# Patient Record
Sex: FEMALE | Race: WHITE
[De-identification: names, ages, dates, MRNs, and addresses within clinical notes are randomized per-mention and may not be internally consistent; named-entity substitution may affect disease eponyms.]

---

## 2018-03-09 ENCOUNTER — HOSPITAL ENCOUNTER (OUTPATIENT)
Dept: HOSPITAL 95 - LAB EV | Age: 83
End: 2018-03-09
Payer: COMMERCIAL

## 2018-03-09 ENCOUNTER — HOSPITAL ENCOUNTER (EMERGENCY)
Dept: HOSPITAL 95 - ER | Age: 83
Discharge: HOME | End: 2018-03-09
Payer: COMMERCIAL

## 2018-03-09 VITALS — BODY MASS INDEX: 27.8 KG/M2 | HEIGHT: 66 IN | WEIGHT: 173 LBS

## 2018-03-09 DIAGNOSIS — I48.0: Primary | ICD-10-CM

## 2018-03-09 DIAGNOSIS — Z79.899: ICD-10-CM

## 2018-03-09 DIAGNOSIS — Z86.19: ICD-10-CM

## 2018-03-09 DIAGNOSIS — I44.7: ICD-10-CM

## 2018-03-09 DIAGNOSIS — Z88.1: ICD-10-CM

## 2018-03-09 DIAGNOSIS — R07.9: Primary | ICD-10-CM

## 2018-03-09 DIAGNOSIS — Z88.5: ICD-10-CM

## 2018-03-09 LAB
ALBUMIN SERPL BCP-MCNC: 3.7 G/DL (ref 3.4–5)
ALBUMIN/GLOB SERPL: 0.9 {RATIO} (ref 0.8–1.8)
ALT SERPL W P-5'-P-CCNC: 19 U/L (ref 12–78)
ANION GAP SERPL CALCULATED.4IONS-SCNC: 8 MMOL/L (ref 6–16)
AST SERPL W P-5'-P-CCNC: 17 U/L (ref 12–37)
BASOPHILS # BLD AUTO: 0.04 K/MM3 (ref 0–0.23)
BASOPHILS NFR BLD AUTO: 1 % (ref 0–2)
BILIRUB SERPL-MCNC: 0.5 MG/DL (ref 0.1–1)
BUN SERPL-MCNC: 13 MG/DL (ref 8–24)
CALCIUM SERPL-MCNC: 9.5 MG/DL (ref 8.5–10.1)
CHLORIDE SERPL-SCNC: 103 MMOL/L (ref 98–108)
CK SERPL-CCNC: 65 U/L (ref 26–192)
CO2 SERPL-SCNC: 27 MMOL/L (ref 21–32)
CREAT SERPL-MCNC: 0.6 MG/DL (ref 0.4–1)
DEPRECATED RDW RBC AUTO: 46.3 FL (ref 35.1–46.3)
EOSINOPHIL # BLD AUTO: 0.06 K/MM3 (ref 0–0.68)
EOSINOPHIL NFR BLD AUTO: 1 % (ref 0–6)
ERYTHROCYTE [DISTWIDTH] IN BLOOD BY AUTOMATED COUNT: 13.5 % (ref 11.7–14.2)
GLOBULIN SER CALC-MCNC: 4 G/DL (ref 2.2–4)
GLUCOSE SERPL-MCNC: 114 MG/DL (ref 70–99)
HCT VFR BLD AUTO: 41.2 % (ref 33–51)
HGB BLD-MCNC: 13.9 G/DL (ref 11.5–16)
IMM GRANULOCYTES # BLD AUTO: 0.04 K/MM3 (ref 0–0.1)
IMM GRANULOCYTES NFR BLD AUTO: 1 % (ref 0–1)
LYMPHOCYTES # BLD AUTO: 1.45 K/MM3 (ref 0.84–5.2)
LYMPHOCYTES NFR BLD AUTO: 22 % (ref 21–46)
MCHC RBC AUTO-ENTMCNC: 33.7 G/DL (ref 31.5–36.5)
MCV RBC AUTO: 94 FL (ref 80–100)
MONOCYTES # BLD AUTO: 0.79 K/MM3 (ref 0.16–1.47)
MONOCYTES NFR BLD AUTO: 12 % (ref 4–13)
NEUTROPHILS # BLD AUTO: 4.15 K/MM3 (ref 1.96–9.15)
NEUTROPHILS NFR BLD AUTO: 64 % (ref 41–73)
NRBC # BLD AUTO: 0 K/MM3 (ref 0–0.02)
NRBC BLD AUTO-RTO: 0 /100 WBC (ref 0–0.2)
PLATELET # BLD AUTO: 311 K/MM3 (ref 150–400)
POTASSIUM SERPL-SCNC: 4 MMOL/L (ref 3.5–5.5)
PROT SERPL-MCNC: 7.7 G/DL (ref 6.4–8.2)
SODIUM SERPL-SCNC: 138 MMOL/L (ref 136–145)
TROPONIN I SERPL-MCNC: <0.017 NG/ML (ref 0–0.04)
TSH SERPL DL<=0.005 MIU/L-ACNC: 2.71 UIU/ML (ref 0.36–4.8)

## 2018-06-30 ENCOUNTER — HOSPITAL ENCOUNTER (EMERGENCY)
Dept: HOSPITAL 95 - ER | Age: 83
Discharge: HOME | End: 2018-06-30
Payer: MEDICARE

## 2018-06-30 VITALS — BODY MASS INDEX: 27.32 KG/M2 | WEIGHT: 170 LBS | HEIGHT: 66 IN

## 2018-06-30 DIAGNOSIS — I48.92: ICD-10-CM

## 2018-06-30 DIAGNOSIS — I48.91: Primary | ICD-10-CM

## 2018-06-30 DIAGNOSIS — Z79.899: ICD-10-CM

## 2018-06-30 DIAGNOSIS — I10: ICD-10-CM

## 2018-06-30 DIAGNOSIS — E78.00: ICD-10-CM

## 2018-06-30 LAB
ALBUMIN SERPL BCP-MCNC: 3.6 G/DL (ref 3.4–5)
ALBUMIN/GLOB SERPL: 1 {RATIO} (ref 0.8–1.8)
ALT SERPL W P-5'-P-CCNC: 17 U/L (ref 12–78)
ANION GAP SERPL CALCULATED.4IONS-SCNC: 7 MMOL/L (ref 6–16)
AST SERPL W P-5'-P-CCNC: 21 U/L (ref 12–37)
BASOPHILS # BLD AUTO: 0.05 K/MM3 (ref 0–0.23)
BASOPHILS NFR BLD AUTO: 1 % (ref 0–2)
BILIRUB SERPL-MCNC: 0.3 MG/DL (ref 0.1–1)
BUN SERPL-MCNC: 19 MG/DL (ref 8–24)
CALCIUM SERPL-MCNC: 9.2 MG/DL (ref 8.5–10.1)
CHLORIDE SERPL-SCNC: 106 MMOL/L (ref 98–108)
CO2 SERPL-SCNC: 24 MMOL/L (ref 21–32)
CREAT SERPL-MCNC: 0.64 MG/DL (ref 0.4–1)
DEPRECATED RDW RBC AUTO: 44.1 FL (ref 35.1–46.3)
EOSINOPHIL # BLD AUTO: 0.07 K/MM3 (ref 0–0.68)
EOSINOPHIL NFR BLD AUTO: 1 % (ref 0–6)
ERYTHROCYTE [DISTWIDTH] IN BLOOD BY AUTOMATED COUNT: 12.8 % (ref 11.7–14.2)
GLOBULIN SER CALC-MCNC: 3.7 G/DL (ref 2.2–4)
GLUCOSE SERPL-MCNC: 116 MG/DL (ref 70–99)
HCT VFR BLD AUTO: 43 % (ref 33–51)
HGB BLD-MCNC: 14.1 G/DL (ref 11.5–16)
IMM GRANULOCYTES # BLD AUTO: 0.03 K/MM3 (ref 0–0.1)
IMM GRANULOCYTES NFR BLD AUTO: 0 % (ref 0–1)
LYMPHOCYTES # BLD AUTO: 2.7 K/MM3 (ref 0.84–5.2)
LYMPHOCYTES NFR BLD AUTO: 33 % (ref 21–46)
MAGNESIUM SERPL-MCNC: 2 MG/DL (ref 1.6–2.4)
MCHC RBC AUTO-ENTMCNC: 32.8 G/DL (ref 31.5–36.5)
MCV RBC AUTO: 94 FL (ref 80–100)
MONOCYTES # BLD AUTO: 1.01 K/MM3 (ref 0.16–1.47)
MONOCYTES NFR BLD AUTO: 12 % (ref 4–13)
NEUTROPHILS # BLD AUTO: 4.45 K/MM3 (ref 1.96–9.15)
NEUTROPHILS NFR BLD AUTO: 54 % (ref 41–73)
NRBC # BLD AUTO: 0 K/MM3 (ref 0–0.02)
NRBC BLD AUTO-RTO: 0 /100 WBC (ref 0–0.2)
PLATELET # BLD AUTO: 289 K/MM3 (ref 150–400)
POTASSIUM SERPL-SCNC: 3.9 MMOL/L (ref 3.5–5.5)
PROT SERPL-MCNC: 7.3 G/DL (ref 6.4–8.2)
SODIUM SERPL-SCNC: 137 MMOL/L (ref 136–145)
TROPONIN I SERPL-MCNC: <0.015 NG/ML (ref 0–0.04)
TSH SERPL DL<=0.005 MIU/L-ACNC: 2.92 UIU/ML (ref 0.36–4.8)

## 2019-03-09 ENCOUNTER — HOSPITAL ENCOUNTER (OUTPATIENT)
Dept: HOSPITAL 95 - LAB EV | Age: 84
Discharge: HOME | End: 2019-03-09
Attending: PHYSICIAN ASSISTANT
Payer: COMMERCIAL

## 2019-03-09 DIAGNOSIS — R05: Primary | ICD-10-CM

## 2019-03-09 LAB
ANION GAP SERPL CALCULATED.4IONS-SCNC: 11 MMOL/L (ref 6–16)
BASOPHILS # BLD AUTO: 0.01 K/MM3 (ref 0–0.23)
BASOPHILS NFR BLD AUTO: 0 % (ref 0–2)
BUN SERPL-MCNC: 10 MG/DL (ref 8–24)
CALCIUM SERPL-MCNC: 9.5 MG/DL (ref 8.5–10.1)
CHLORIDE SERPL-SCNC: 92 MMOL/L (ref 98–108)
CO2 SERPL-SCNC: 25 MMOL/L (ref 21–32)
CREAT SERPL-MCNC: 0.73 MG/DL (ref 0.4–1)
DEPRECATED RDW RBC AUTO: 43.8 FL (ref 35.1–46.3)
EOSINOPHIL # BLD AUTO: 0 K/MM3 (ref 0–0.68)
EOSINOPHIL NFR BLD AUTO: 0 % (ref 0–6)
ERYTHROCYTE [DISTWIDTH] IN BLOOD BY AUTOMATED COUNT: 12.7 % (ref 11.7–14.2)
GLUCOSE SERPL-MCNC: 133 MG/DL (ref 70–99)
HCT VFR BLD AUTO: 42.4 % (ref 33–51)
HGB BLD-MCNC: 14.6 G/DL (ref 11.5–16)
IMM GRANULOCYTES # BLD AUTO: 0.04 K/MM3 (ref 0–0.1)
IMM GRANULOCYTES NFR BLD AUTO: 1 % (ref 0–1)
LYMPHOCYTES # BLD AUTO: 1.09 K/MM3 (ref 0.84–5.2)
LYMPHOCYTES NFR BLD AUTO: 13 % (ref 21–46)
MCHC RBC AUTO-ENTMCNC: 34.4 G/DL (ref 31.5–36.5)
MCV RBC AUTO: 94 FL (ref 80–100)
MONOCYTES # BLD AUTO: 0.46 K/MM3 (ref 0.16–1.47)
MONOCYTES NFR BLD AUTO: 6 % (ref 4–13)
NEUTROPHILS # BLD AUTO: 6.51 K/MM3 (ref 1.96–9.15)
NEUTROPHILS NFR BLD AUTO: 80 % (ref 41–73)
NRBC # BLD AUTO: 0 K/MM3 (ref 0–0.02)
NRBC BLD AUTO-RTO: 0 /100 WBC (ref 0–0.2)
PLATELET # BLD AUTO: 274 K/MM3 (ref 150–400)
POTASSIUM SERPL-SCNC: 4.3 MMOL/L (ref 3.5–5.5)
SODIUM SERPL-SCNC: 128 MMOL/L (ref 136–145)

## 2019-11-29 NOTE — NUR
INITIAL ASSESMENT
 
PT ADMITTED FROM ED FOR AF RVR WHICH WAS CONTROLLED TO THE 110S AND
TRANSFERED TO PCU3 VIA STRETCHER AND AMBULATED TO BED AND BATHROOM WITHOUT
DIFFICULTY, VERTIGO OR DIZINESS. AF IN -170S WHEN AMBULATING, MD
ADVISED. NO ORDERS RECEIVED. PALP PULSES, NO EDEMA, GOOD CAP REFILL, NO C/O CP
OR SOB. RA SATS IN THE HIGH 90S. SBP STABLE. SL. CARDIAC DIET WITH GOOD PO
INTAKE. UO ADEQUATE CLEAR AND YELLOW. SKIN INTACT. WILL CONT TO MONITOR

## 2019-11-30 NOTE — NUR
SHIFT SUMMARY
PT HAS NOT RESTED WELL THROUGH THE NIGHT, STATES "I'M JUST NOT ABLE TO GET
COMFORTABLE BECAUSE I USUALLY SLEEP ON MY SIDE AND I DON'T FEEL LIKE I CAN DO
THAT WITH MY BREATHING". OVERALL SOME IMPROVEMENT R/T BETTER HR CONTROL WITH
CARDIZEM GTT RUNNING AT 5 MG/HR FOR HR 90'S AT REST AND UP 'S WITH
ACTIVITY. PT STATES "I FEEL BETTER BUT I'M REALLY DISAPPOINTED THAT IT DIDN'T
CHANGE TO A NORMAL RHYTHM". DENIES PAIN OR OTHER COMPLAINTS THIS MORNING,
APPEARS RELAXED AND COMFORTABLE. VSS, SEE ASSESSMENT FOR DETAILS. LUNG SOUNDS
ARE CLEAR T/O AND SATS ARE >94% ON ROOM AIR SO PROVIDED EDUCATION R/T RAPID HR
AND SENSATION OF SHORTNESS OF BREATH. NO ACUTE CONCERNS THIS AM, PLAN FOR
CARDIOLOGY CONSULT TODAY. WILL PROVIDE BEDSIDE REPORT TO ONCOMING RN.

## 2019-11-30 NOTE — NUR
a+o, rm air, drip running with no s/sx of infection or infiltration at site,
call light in reach, able to make needs known, afib heart rate increasing,
waiting on new medication, bsr shared with pt and day shift denied pain at
that time

## 2019-12-01 NOTE — NUR
START OF SHIFT:
REPORT FORM DANUTA ALVARENGA. PT UP IN ROOM PLEASANT INDEPENDENTLY WITHOUT ANY
DIFFICULTY OR COMPLAINTS. VSS. QTc 0.480, Qt 0.384 AT 2118. CALL LIGHT WITHIN
REACH.

## 2019-12-01 NOTE — NUR
SHIFT SUMMARY
PT HAS REMAINED IN SINUS RHYTHM THROUGHOUT SHIFT. THIS AFTERNOON PT C/O
INCREASED SOB WHILE AT REST. NOTIFIED DR GRIMALDO AND ORDERS RECEIVED. PT FELT
THAT THE SOB HAD IMPROVED THIS EVENING. PT HAS BEEN UP IN ROOM WITH SBA.

## 2019-12-01 NOTE — NUR
SHIFT SUMMARY
 
LYING IN SEMI FOWLERS WITH EYES CLOSED.  HAS RESTED WELL THIS SHIFT.  CARDIZEM
WAS D/C'S WHEN PT HAD MAINTAINED CONVERSIO FOR AN HOUR, AND HR BEGAN TO DECELL
INTO THE 50'S.  STATES THAT SHE FEELS MUCH BETTER AND IS VERY HAPPY THAT HER
HEART HAS CONVERTED.  DENIES PAIN, DISCOMFORT, OR FURTHER NEEDS AT THIS TIME.
WILL GIVE HAND OFF TO ONCOMING SHIFT USING SBAR.

## 2019-12-01 NOTE — NUR
CALLED DR GRIMALDO AS PT C/O INCREASED SOB AT REST. NOTIFIED  THAT PT'S LUNGS
ARE CLEAR TO ASCULATION, SINUS RHYTHM 70'S AND VITALS STABLE. PT REMAINS ON
ROOM AIR. ORDERS RECEIVED FOR XRAY. WILL CONTINUE TO MONITOR AND UPDATE PT.

## 2019-12-01 NOTE — NUR
CARDIZEM D/C'D AFTER PT CONVERTED TO NSR, MAINTAINED RHYTHM FOR 1HR IN THE
70'S/80'S.  HR THEN DROPPED INTO 50'S AND SUSTAINED, BUT PT IS ASYMPTOMATIC
AT THIS TIME.  PT TOLERATED WELL.  DENIES FURTHER NEEDS AT THIS TIME.
SAFETY MEASURES IN PLACE.  WILL CONTINUE TO MONITOR.

## 2019-12-02 NOTE — NUR
UPDATE:
PT UP THIS AM STATING SHE IS FEELING SO MUCH BETTER AND HAS HAD THE BEST
SLEEP. VSS. PT CONTINUING UP INDEPENDENT IN ROOM WITHOUT DIFFICULTY. CALL
LIGHT WITHIN REACH.

## 2019-12-02 NOTE — NUR
PT PROVIDED WITH HER HOME MEDS AND ALL BELONGINGS. PT EDUCATED ABOUT NEW
MEDICATIONS AND SIDE EFFECTS, PT EDUCATED THAT RX WAS CALLED INTO [facility]. PT
EXPRESSED UNDERSTANDIG OF DC EDUCATION, DENIES FURTHER NEEDS OR QUESTIONS. PT
TAKEN OUT VIA W/C VIA PCT

## 2021-10-26 NOTE — NUR
OPENING NOTE
RECEIVED REPORT FROM CHEKO ALVARENGA AND ASSUMED PT CARE. PT IS RESTING IN BED, C/O
FEELING SHORTNESS OF BREATH. TELE IS SHOWING AFIB UP 'S WITH ACTIVITY
'S AT REST. OTHER VITAL SIGNS STABLE. DR. GRIMALDO AT THE BEDSIDE FOR
ADMISSION WITH PLAN TO START A CARDIZEM GTT. WILL INITIATE ADMIT ORDERS WHEN
ENTERED AND WILL MONITOR CLOSELY FOR DECOMPENSATION. Yes

## 2021-11-30 NOTE — NUR
NIGHT SHIFT SUMMARY
PT IS AXO X4 THIS SHIFT. PT ARRIVED TO THE UNIT ON THE CARDIZEM GTT AT 5MG/HR
W A HR . BP WNL AND STABLE THIS SHIFT. O2 SATS >90% ON 2L VIA NC. PT
AFEBRILE THIS SHIFT. CARDIZEM GTT TURNED OFF AT 0400 THIS AM FOR A HR
SUSTAINED IN THE 80'S HOWEVER HER HR BEGAN TO CLIMB SO ORDER TO GIVE HER THE
METOPOROL XL EARLY THIS AM. WILL REPORT TO ONCOMING RN.

## 2021-11-30 NOTE — NUR
Tearful, crying and saying that she hurts on her right lower back. Irritable
with questions about the location and severity of pain, also irritated that
vital signs were taken before administration of NOrco.  She apologized
afterwards.   here at this time to see the patient.

## 2021-11-30 NOTE — NUR
Initial Assessment with [facility] Care Coordinator
1. Who did you speak with?  Spoke with patient and spouse (spouse was
visiting)
2.  What is the patient's prior level of functions? Independent lives with her
 and daughter
3.  What is the patient's current living situation?  Patient lives with her
 and daughter.  Patient has a safe and stable home with running water,
heat electricity, and sewage.  No barriers at this time.
4.  Is the patient and/or family able to provide transportation to and from
doctor's appointments and  prescriptions? Yes
5.  Does patient still drive?  No her  and daughter drives her to and
from
6. POA/PCP/NOK:  PCP-Dr. Osborne
7.  Discharge goals:
 -Patient is returning to her residence
 -DME: patient has a walker
 -Medication Management:  assists with medication
 -Preferred Pharmacy: [facility]
 -Housekeeping need:  and daughter helps with housekeeping needs
 -Able to cook for self:  and daughter assists with cooking/meals
8.  List barriers to discharge
 -SNF Placement:  No
 -Memory Care: No
 -Transportation needs: No
 -Financial concerns: No
 -Drug/Alcohol treatment: No
 -Home Health: No
 -Hospice: No
9.  Discharge Plan: Return to her residence
10. PCP Follow up appointment: Will be scheduled within seven calendar days of
discharge.

## 2021-11-30 NOTE — NUR
PT states that her pain level is down to 5/10 from 10/10.  sTates that the
NOrco has helped, as well as the ice.   asking for something for
anxiety for the pt.

## 2021-11-30 NOTE — NUR
States no relief from Norco. Given a pillow and ice pack to her right lower
back, which she states feels good.  is at the bedside.

## 2021-11-30 NOTE — NUR
ICE PACK to the right lower back refreshed at this time. Dr. Oneal here to see
pt and her  at this time.

## 2021-12-01 NOTE — NUR
SHIFT SUMMARY
 
PT A&OX3. FOLLOWS COMMANDS, ANXIOUS. OCCASIONAL PARANOIA. PT STATES, "THE LAB
PERSON WAS UNABLE TO DRAW BLOOD, I SAW NO BLOOD COME FROM HIS NEEDLE. iF THERE
ARE LAB RESULTS, THEY ARE MADE UP". tHIS RN CALLED LAB AND LAB VERIFIED THEY
DID, IN FACT, DRAW BLOOD AND ARE PROCESSING THE RESULTS. SP02>92% ON RA. PT
WORE HOME CPAP AT [facility]. TELEMETRY READ PACED, AFIB, HR 90'S-110'S. PT UP TO BSC
TO VOID MULTIPLE TIMES DURING SHIFT. ABX INFUSED PER EMAR. PT C/O OF 7/10 RIB
PAIN ON RIGHT SIDE. MEDICATED PER EMAR. CALL LIGHT IN REACH.

## 2021-12-01 NOTE — NUR
Per Dr. GERTRUDIS Oneal discharge appropriate for today (12/1/2021).  Spoke with
patient and spouse and they are aware of discharge and do not oppose.
Patient's  will transport to their residence. [employer] will be contacted
to order a front wheeled walker to be delivered to residence.   Patient has a
scheduled follow up appointment with PCP.  [facility] has been
contacted for home health needs per doctor's request.  Patient has a strong
support network ( and daughter).   Patient to contact PCP for if
condition worsens or any question on medication management. No barriers to
discharge at this time.

## 2021-12-01 NOTE — NUR
pt's heart rate has been 78-92 for 5 hours, paced with occasional native beats
noted.  Vital signs are stable.  She has been medicated with Norco for pain at
11:30 and sat up in the chair for lunch.  Ambulatory to the bathroom with
walker independently while  in the room with her after discharge
instructions were reviewed with both of them. Extensively reviewed the
discharge medications, follow up appoinments, and strong recommendations
including the use of the incentive spirometer and activity to avoid worsening
pneumonia.  Pt's  packed the incentive spirometer in her belongings to
take home.  Pt has a follow up appointment with Dr. Major on Tuesday.  Pt's
 states that Maria Antonia sees a cardiologist in [place], Dr. Delvalle, and that
he will call to make a follow up appointment for her to be seen 1-2 weeks from
today.
IV left forearm was discontinued, coban and gauze applied.

## 2021-12-01 NOTE — NUR
Bedside report received from Mely ALVARENGA.  Pt is very anxious, upset saying that
the phebotomist at 3 am did not actually draw any blood; that whatever lab
results there are they are not hers.  She is extremely upset about this.
Heart rate noted up to 173, ranging 133-172 underlying atrial fibrillation and
also showing occasionally paced beats.  Blood pressure is stable, as well as
other vital signs at this time. Scheduled toprol xl and cardizem were given
early due to the afib with RVR.
Dr. Oneal was called, advised of pt situation. He came and spoke with the
patient, and we are re-drawing the labs now.  Chest x ray will also be done
after the labs have been drawn.

## 2021-12-01 NOTE — NUR
Dr. Oneal here again to see the patient.  Possibly home with home health
today.   expressing questions about medications and prescriptions at
discharge.

## 2021-12-01 NOTE — NUR
PT UPDATE
 
PT REMAINED FOCUSED ON "THE LAB KEVIN THAT DIDNT DRAW BLOOD". PT BECAME
INCREASINGLY ANXIOUS AND HEARTRATE INCREASED UP 'S. TELEMETRY CALLED AND
NOTIFIED OF SVT WITH FUSION BEATS. PRN ANXIETY MEDICATION GIVEN PER EMAR. ALSO
GAVE 0900 METOPROLOL EARLY FOR HEART RATE. PT CURRENTLY RESTING IN ROOM, HR
120'S.

## 2021-12-07 NOTE — NUR
Alert and oriented x2 ,  at bedside.Anxious , ativan 0.5 mg po was
given , it was effective. Fentanyl 25 mcg was given for pain management , it
was effective. Recieved an order for lasix 40 IV BID. Wheezing noted.On
continue oxygen monitoring , sp02 at 94% or greater.On tele monitor , pacing
at 106. Call light within . Continue to monitor.

## 2021-12-07 NOTE — NUR
Initial Assessment with [facility] Care Coordinator
1. Who did you speak with?  Spoke with patient and spouse (spouse was
visiting)
2.  What is the patient's prior level of functions? Independent lives with her
 and daughter
3.  What is the patient's current living situation?  Patient lives with her
 and daughter.  Patient has a safe and stable home with running water,
heat electricity, and sewage.  No barriers at this time.
4.  Is the patient and/or family able to provide transportation to and from
doctor's appointments and  prescriptions? Yes,  and daughter
provide transportation.
5.  Does patient still drive?  No her  and daughter provide
transportation as needed
6. POA/PCP/NOK:  PCP-Dr. Osborne/ [facility] is NOK
7.  Discharge goals:
 -Patient is returning to residence: TBD
 -DME: patient has a walker
 -Medication Management:  assists with medication
 -Preferred Pharmacy: [facility]
 -Housekeeping need:  and daughter helps with housekeeping needs
 -Able to cook for self:  and daughter assists with cooking/meals
8.  List barriers to discharge
 -SNF Placement:  No
 -Memory Care: No
 -Transportation needs: No
 -Financial concerns: No
 -Drug/Alcohol treatment: No
 -Home Health: No
 -Hospice: No
9.  Discharge Plan: TBD
10. PCP Follow up appointment: Will be scheduled within seven calendar days of
discharge.

## 2021-12-07 NOTE — NUR
NIGHT SUMMARY/ ADMISSION
PATIENT WAS BROUGHT FROM THE ED, AO, SHE IS ON OXYGEN @ 4L NC. SHE STATED SHE
IS NOT IN PAIN AS SHE JUST GOT HER PAIN MED. SHE WAS LATER GIVEN SOME PAIN MED
AS NEEDED, SEE EMAR AS SHE HAS PAIN ON HER RIGHT SIDE. SHE HAS FRACTURE ON
THAT SIDE. ASSESSMENT DONE AND RECORDED. NO SKIN BREAKDOWN. SAFETY MEASURES
IN PLACE. WILL CONTIUE TO MONITOR HER.

## 2021-12-08 NOTE — NUR
NIGHT SHIFT SUMMARY
PATIENT HAD A FAIR SHIFT, NO EVENTS OVERNIGHT. HAD PRN PAIN MED ONE TIME,
SLEPT BETTER. WILL CONTINUE TO MONITOR HER.

## 2021-12-08 NOTE — NUR
Alert and oriented x2 with some confusion and anxiety. c/o right rib pain,
fentayl 25 mcg was given and it was effective. Ativan 0.5 mg was given for
anxiety with some relief. lasix was given for pleural effusion/chf. Continue
on 4 L N/C , no SOB noted. Used IS as tolerated. Bed alarm and call light
within . Continue to monitor.

## 2021-12-09 NOTE — NUR
SUMMARY
PT HAS BEEN VOIDING WELL T/OUT SHIFT. PT HAS PERIODS OF INCREASED CONFUSION
AND ANXIETY. PT ABLE TO RELAX AND SLEEP. PT TOLERATES CPAP FOR SHORT PERIODS
ONLY. PT SATS MAINTAINED WELL W/ NC @ 4LPM. PT CURRENTLY SLEEPING IN NO
DISTRESS. CALL LIGHT IN REACH.

## 2021-12-09 NOTE — NUR
Alert and oriented x3, c/o right rib , norco and toradol was given  , it was
effective. Continue on 4 L n/c , no sob noted. Tele monitor with a normal
sinus except with activities. Ativan was given for anxiety , it was effective.
vital signs are stable. Granddaughter came to visit. Continue to monitor.

## 2021-12-10 NOTE — NUR
Alert and oriented x3 , able to make needs known. c/o  right rib pain 7/10 ,
norco 2 tabs was given and it was effective. One person assist with ADLS.
Continue on tele monitor , afib at 110. Lasix was given for pleural effusion.
Voided x3. Patient discharged in a stable condition and discharged instruction
was acknowledged.

## 2021-12-10 NOTE — NUR
SHIFT SUMMARY
 
ANNE IS A&O X3. IV ACCESS IN LEFT AC. HX OF ANXIETY, GRAND-DAUGHTER XAVIER
IS AT THE BEDSIDE THROUGH OUT THE NIGHT.
. LUNG SOUNDS ARE CLEAR, USING OXYGEN 4LPM VIA NC. ONE PERSON
SBA TO BSC. C/O PAIN TO RIGHT RIB, RECIEVED PRN NORCO. PT WAS NOT ABLE TO
TOLERATE WEARING THE CPAP FOR SLEEP THIS EVENING. TELEMETRY MONITORED 95-96%
SPO2, HEARTATE 87-'S, PACED WITH PVC'S. PLAN IS TO D/C TO REHAB.
ENCOURAGED TO USE ISO 5X/EVERY HOUR AND USE PUTTY FOR OT HAND EXERCISES. PT IS
MOTIVATED TO GO HOME.

## 2021-12-10 NOTE — NUR
Per Dr. Hale discharge appropriate for 12/10/21.  Spoke with patient and her
 and they do not oppose discharge.  Patient has transportation and
returning home to their residence.  Patient is still on service with [employer].  Contacted Summer from [employer] and gave discharge date.  Patient
has DME needs.  M transition of care will contact patient to coordinate PCP
follow up visit.  Patient has a strong support network of family and friends.
Patient does not have any barriers to discharge at this time.